# Patient Record
Sex: MALE | Race: WHITE | URBAN - METROPOLITAN AREA
[De-identification: names, ages, dates, MRNs, and addresses within clinical notes are randomized per-mention and may not be internally consistent; named-entity substitution may affect disease eponyms.]

---

## 2024-09-20 ENCOUNTER — OFFICE VISIT (OUTPATIENT)
Dept: INTERNAL MEDICINE CLINIC | Facility: CLINIC | Age: 59
End: 2024-09-20
Payer: COMMERCIAL

## 2024-09-20 VITALS
HEIGHT: 72.6 IN | WEIGHT: 252 LBS | OXYGEN SATURATION: 96 % | HEART RATE: 60 BPM | RESPIRATION RATE: 18 BRPM | TEMPERATURE: 98 F | BODY MASS INDEX: 33.76 KG/M2 | DIASTOLIC BLOOD PRESSURE: 68 MMHG | SYSTOLIC BLOOD PRESSURE: 102 MMHG

## 2024-09-20 DIAGNOSIS — E29.1 TESTOSTERONE DEFICIENCY IN MALE: ICD-10-CM

## 2024-09-20 DIAGNOSIS — N52.03 COMBINED ARTERIAL INSUFFICIENCY AND CORPORO-VENOUS OCCLUSIVE ERECTILE DYSFUNCTION: ICD-10-CM

## 2024-09-20 DIAGNOSIS — Z86.79 HISTORY OF ATRIAL FIBRILLATION: Primary | ICD-10-CM

## 2024-09-20 DIAGNOSIS — R97.20 ELEVATED PSA: ICD-10-CM

## 2024-09-20 PROCEDURE — 3008F BODY MASS INDEX DOCD: CPT | Performed by: INTERNAL MEDICINE

## 2024-09-20 PROCEDURE — 3078F DIAST BP <80 MM HG: CPT | Performed by: INTERNAL MEDICINE

## 2024-09-20 PROCEDURE — 3074F SYST BP LT 130 MM HG: CPT | Performed by: INTERNAL MEDICINE

## 2024-09-20 PROCEDURE — 99204 OFFICE O/P NEW MOD 45 MIN: CPT | Performed by: INTERNAL MEDICINE

## 2024-09-20 RX ORDER — TADALAFIL 20 MG/1
20 TABLET ORAL
COMMUNITY
Start: 2024-05-24

## 2024-09-20 RX ORDER — IPRATROPIUM BROMIDE AND ALBUTEROL SULFATE 2.5; .5 MG/3ML; MG/3ML
3 SOLUTION RESPIRATORY (INHALATION)
COMMUNITY
Start: 2024-07-12

## 2024-09-20 RX ORDER — PROPRANOLOL HCL 20 MG
20 TABLET ORAL 3 TIMES DAILY
COMMUNITY
Start: 2024-09-12

## 2024-09-20 RX ORDER — TESTOSTERONE CYPIONATE 200 MG/ML
VIAL (ML) INTRAMUSCULAR
COMMUNITY

## 2024-09-20 NOTE — PROGRESS NOTES
Patient ID: David Zepeda is a 59 year old male.  Chief Complaint   Patient presents with    Establish Care        HISTORY OF PRESENT ILLNESS:   HPI  Patient presents for above.  New patient here to establish care.  Patient lives in [place] but works in the [place] area.  He has lived in many places where he has different consultants as outlined below:     ???? - 2005 - [place]  2006 - 2015 - [place]  2016 - 2017 - [place]  2017 - 2019 - [place] (cardiologist)  2020 - 2022 - [place] (urologist)  2023 - Present - [place] (PCP, urologist)    History of atrial fibrillation diagnosed in approximately 1990.  He was living in [place] at the time.  Does not take any blood thinners.  States he is on propranolol as needed for this.    History of testosterone deficiency diagnosed in mid 2010s.  Has had urologist in [place] and now he [place].  States he gets testosterone injection every 2 weeks for this.  He has missed his last injection as he has been in [place] for an extended period of time for work.  Typically he goes back to [place] every week where he receives his injection on an every other week basis.    History of erectile dysfunction for which he takes Cialis.    Has had elevated PSA level in the past.  Had a biopsy in 2021 while living in [place] and states this was negative.  Subsequent PSA levels have been elevated but being monitor.  States his father also has elevated PSA levels.    Review of Systems  Ten point review of systems otherwise negative with the exception of HPI and assessment and plan.    MEDICAL HISTORY:   History reviewed. No pertinent past medical history.    Past Surgical History:   Procedure Laterality Date    Achilles tendon repair Left     Rotator cuff repair Left          Current Outpatient Medications:     propranolol 20 MG Oral Tab, Take 1 tablet (20 mg total) by mouth 3 (three) times daily., Disp: , Rfl:     Tadalafil 20 MG Oral Tab, Take 1 tablet (20 mg total) by  mouth daily as needed., Disp: , Rfl:     ipratropium-albuterol 0.5-2.5 (3) MG/3ML Inhalation Solution, 3 mL., Disp: , Rfl:     Testosterone Cypionate 200 MG/ML Injection Solution, Inject into the skin. (Patient not taking: Reported on 9/20/2024), Disp: , Rfl:     Allergies:No Known Allergies    Social History     Socioeconomic History    Marital status:      Spouse name: Not on file    Number of children: Not on file    Years of education: Not on file    Highest education level: Not on file   Occupational History    Not on file   Tobacco Use    Smoking status: Never    Smokeless tobacco: Never   Vaping Use    Vaping status: Never Used   Substance and Sexual Activity    Alcohol use: Yes     Alcohol/week: 2.0 standard drinks of alcohol     Types: 2 Standard drinks or equivalent per week     Comment: social    Drug use: Not Currently    Sexual activity: Yes     Partners: Female   Other Topics Concern    Not on file   Social History Narrative    Not on file     Social Determinants of Health     Financial Resource Strain: Not on file   Food Insecurity: Not on file   Transportation Needs: Not on file   Physical Activity: Not on file   Stress: Not on file   Social Connections: Not on file   Housing Stability: Not on file           PHYSICAL EXAM:     Vitals:    09/20/24 1334   BP: 102/68   Pulse: 60   Resp: 18   Temp: 97.8 °F (36.6 °C)   TempSrc: Temporal   SpO2: 96%   Weight: 252 lb (114.3 kg)   Height: 6' 0.6\" (1.844 m)       Body mass index is 33.61 kg/m².    Physical Exam  Constitutional:       Appearance: Normal appearance.   Eyes:      General: No scleral icterus.  Cardiovascular:      Rate and Rhythm: Normal rate and regular rhythm.      Pulses: Normal pulses.      Heart sounds: Normal heart sounds. No murmur heard.  Pulmonary:      Effort: Pulmonary effort is normal. No respiratory distress.      Breath sounds: Normal breath sounds. No stridor. No wheezing or rhonchi.   Abdominal:      General: Abdomen is  flat. Bowel sounds are normal.      Palpations: Abdomen is soft.   Neurological:      Mental Status: He is alert.   Psychiatric:         Mood and Affect: Mood normal.         Behavior: Behavior normal.           ASSESSMENT/PLAN:   1. History of atrial fibrillation  Currently in normal sinus rhythm.  As overnight takes beta-blocker as needed.  Follow-up with his cardiologist.    2. Testosterone deficiency in male  Urology Referral - [place] ([facility])  Explained that testosterone is not given in this office.    3. Combined arterial insufficiency and corporo-venous occlusive erectile dysfunction  Cialis as needed.    4. Elevated PSA  His urologist in [place] is managing.    Return if symptoms worsen or fail to improve.    This note was prepared using Dragon Medical voice recognition dictation software. As a result errors may occur. When identified these errors have been corrected. While every attempt is made to correct errors during dictation discrepancies may still exist.    Jarrett Connor MD  9/20/2024

## 2024-09-25 ENCOUNTER — OFFICE VISIT (OUTPATIENT)
Dept: SURGERY | Facility: CLINIC | Age: 59
End: 2024-09-25
Payer: COMMERCIAL

## 2024-09-25 DIAGNOSIS — E29.1 TESTOSTERONE DEFICIENCY IN MALE: Primary | ICD-10-CM

## 2024-09-25 PROCEDURE — 99204 OFFICE O/P NEW MOD 45 MIN: CPT | Performed by: PHYSICIAN ASSISTANT

## 2024-09-25 NOTE — PROGRESS NOTES
[facility] Urology  Initial Office Consultation    HPI:   David Zepeda is a 59 year old male here today with hopes to receive testosterone injection.  He was seen by Dr. Connor for testosterone injections and Dr. Connor referred him to see us for an office testosterone injections.  The patient travels a lot for work his home is based in [place].  He has a urologist in [facility].  He brought with him some medical records they have been reviewed by myself and by Dr. Faustin.  Medical records show a PSA that was checked January 12, 2024 PSA is 8.3 ng/mL.  Testosterone was 663 ng per DL.  It appears that the administrating provider for testosterone as his family doctor.  Per review of records, the patient had a MRI completed January 16, 2024 which showed \"mildly enlarged prostate, with a small low signal nodule identified in the right peripheral gland, indeterminate.  Advised patient to stop testosterone injections until it is confirmed whether nodule is cancerous.  Patient voiced understanding patient to be referred to urology for consultation and possible biopsy, referral placed today.\"  The patient's last prostate biopsy was about 3 years ago.  He has not had a repeat prostate biopsy based on the MRI January 16, 2024.  He reports that his urologist had no concerns and okay to receive testosterone.  Of note we have no records from his urologist.    No past medical history on file.  Past Surgical History:   Procedure Laterality Date    Achilles tendon repair Left     Rotator cuff repair Left      Family History   Problem Relation Age of Onset    Cancer Father      Social History     Socioeconomic History    Marital status:    Tobacco Use    Smoking status: Never    Smokeless tobacco: Never   Vaping Use    Vaping status: Never Used   Substance and Sexual Activity    Alcohol use: Yes     Alcohol/week: 2.0 standard drinks of alcohol     Types: 2 Standard drinks or equivalent per week     Comment:  social    Drug use: Not Currently    Sexual activity: Yes     Partners: Female     Current Outpatient Medications   Medication Sig Dispense Refill    propranolol 20 MG Oral Tab Take 1 tablet (20 mg total) by mouth 3 (three) times daily.      Tadalafil 20 MG Oral Tab Take 1 tablet (20 mg total) by mouth daily as needed.      ipratropium-albuterol 0.5-2.5 (3) MG/3ML Inhalation Solution 3 mL.      Testosterone Cypionate 200 MG/ML Injection Solution Inject into the skin.         Allergies: Patient has no known allergies.    REVIEW OF SYSTEMS:    EXAM:  There were no vitals taken for this visit.    Physical Exam  Constitutional:       Appearance: Normal appearance.   Pulmonary:      Effort: Pulmonary effort is normal.   Musculoskeletal:         General: Normal range of motion.   Neurological:      Mental Status: He is alert and oriented to person, place, and time.   Psychiatric:         Mood and Affect: Mood normal.         Behavior: Behavior normal.          LABS:  No results found for: \"PSA\", \"QPSA\", \"TOTPSASCREEN\"  No results found for: \"WBC\", \"RBC\", \"HGB\", \"HCT\", \"MCV\", \"MCH\", \"MCHC\", \"RDW\", \"PLT\", \"MPV\"  No results found for: \"GLU\", \"BUN\", \"BUNCREA\", \"CREATSERUM\", \"ANIONGAP\", \"GFR\", \"GFRNAA\", \"GFRAA\", \"CA\", \"NA\", \"K\", \"CL\", \"CO2\"  No results found for: \"PTP\", \"PT\", \"INR\"    IMAGING:  No results found.    IMPRESSION:  Male who is in [place] for work.  Lives in [place] normally.  Has gone 4 weeks without testosterone.  Testosterone was administered by his PCP.  Patient was hoping to receive testosterone injections here.  His last PSA from January was elevated at 8.3.  An MRI of the prostate completed in January 2024 showing indeterminate nodule.  Recommendation was to follow-up with urology for consultation possible biopsy to confirm whether the nodule is cancerous or noncancerous.  Patient's last prostate biopsy was 3 years ago.  We have no records from his urologist.  I reviewed the case with Dr. Faustin we  reviewed his medical records.  Discussed with the patient that given the elevated PSA in January as well as the MRI findings in January without an updated prostate biopsy as well as not having any trends of his PSA or what his PSA was at the time of the prostate biopsy 3 years ago we cannot comfortably prescribe testosterone.  The patient understood but obviously was disappointed as he was hoping to receive testosterone injections today.  Advised patient that we could obtain medical records from his urologist and make a decision moving forward depending on those recommendations.  Patient advises that he will reach out to his PCP and see if they can prescribe testosterone to him and he can self administer.    PLAN:  RTC ANN MARIE Byrd PA-C  9/25/2024 11:08 AM

## 2025-03-26 ENCOUNTER — TELEPHONE (OUTPATIENT)
Dept: INTERNAL MEDICINE CLINIC | Facility: CLINIC | Age: 60
End: 2025-03-26